# Patient Record
Sex: FEMALE | Race: WHITE | Employment: UNEMPLOYED | URBAN - METROPOLITAN AREA
[De-identification: names, ages, dates, MRNs, and addresses within clinical notes are randomized per-mention and may not be internally consistent; named-entity substitution may affect disease eponyms.]

---

## 2017-06-08 PROBLEM — Z72.0 TOBACCO USE: Status: ACTIVE | Noted: 2017-06-08

## 2017-06-08 PROBLEM — R10.84 GENERALIZED ABDOMINAL PAIN: Status: ACTIVE | Noted: 2017-06-08

## 2017-06-08 PROBLEM — R51.9 FREQUENT HEADACHES: Status: ACTIVE | Noted: 2017-06-08

## 2017-06-08 PROBLEM — R79.89 ELEVATED LIVER FUNCTION TESTS: Status: ACTIVE | Noted: 2017-06-08

## 2017-07-07 PROBLEM — B17.10 ACUTE HEPATITIS C VIRUS INFECTION WITHOUT HEPATIC COMA: Status: ACTIVE | Noted: 2017-07-07

## 2017-11-06 PROBLEM — O98.412 CHRONIC HEPATITIS C AFFECTING ANTEPARTUM CARE OF MOTHER IN SECOND TRIMESTER (HCC): Status: ACTIVE | Noted: 2017-07-07

## 2017-11-06 PROBLEM — F11.20 HEROIN ADDICTION (HCC): Status: ACTIVE | Noted: 2017-11-06

## 2017-11-06 PROBLEM — Z03.75 CERVICAL SHORTENING SUSPECTED BUT NOT FOUND: Status: ACTIVE | Noted: 2017-11-06

## 2017-11-06 PROBLEM — O99.332 TOBACCO USE AFFECTING PREGNANCY IN SECOND TRIMESTER, ANTEPARTUM: Status: ACTIVE | Noted: 2017-11-06

## 2017-11-06 PROBLEM — B18.2 CHRONIC HEPATITIS C AFFECTING ANTEPARTUM CARE OF MOTHER IN SECOND TRIMESTER (HCC): Status: ACTIVE | Noted: 2017-07-07

## 2018-01-15 PROBLEM — O26.643 INTRAHEPATIC CHOLESTASIS OF PREGNANCY IN THIRD TRIMESTER, ANTEPARTUM: Status: ACTIVE | Noted: 2018-01-15

## 2018-01-15 PROBLEM — K83.1 INTRAHEPATIC CHOLESTASIS OF PREGNANCY IN THIRD TRIMESTER, ANTEPARTUM: Status: ACTIVE | Noted: 2018-01-15

## 2018-01-15 PROBLEM — L29.9 PRURITUS: Status: ACTIVE | Noted: 2018-01-15

## 2018-01-15 PROBLEM — O26.613 INTRAHEPATIC CHOLESTASIS OF PREGNANCY IN THIRD TRIMESTER, ANTEPARTUM: Status: ACTIVE | Noted: 2018-01-15

## 2018-02-13 PROBLEM — O36.5930 POOR FETAL GROWTH AFFECTING MANAGEMENT OF MOTHER IN THIRD TRIMESTER: Status: ACTIVE | Noted: 2018-02-13

## 2018-02-24 PROBLEM — Z36.89 NST (NON-STRESS TEST) REACTIVE: Status: ACTIVE | Noted: 2018-02-24

## 2018-02-28 PROBLEM — Z87.898 HISTORY OF POOR FETAL GROWTH: Status: ACTIVE | Noted: 2018-02-13

## 2018-03-05 PROBLEM — Z3A.36 36 WEEKS GESTATION OF PREGNANCY: Status: ACTIVE | Noted: 2018-03-05

## 2018-03-06 PROBLEM — Z3A.36 PREGNANCY WITH 36 COMPLETED WEEKS GESTATION: Status: ACTIVE | Noted: 2018-03-06

## 2018-03-06 PROCEDURE — 9990 CHARGE CONVERSION

## 2018-03-06 PROCEDURE — 96372 THER/PROPH/DIAG INJ SC/IM: CPT

## 2018-03-08 ENCOUNTER — HOSPITAL ENCOUNTER (INPATIENT)
Dept: MOTHER INFANT UNIT | Age: 28
LOS: 3 days | Discharge: HOME | DRG: 560 | End: 2018-03-11
Attending: OBSTETRICS & GYNECOLOGY | Admitting: OBSTETRICS & GYNECOLOGY
Payer: COMMERCIAL

## 2018-03-08 LAB
ABO/RH: NORMAL
AMPHETAMINE SCREEN, URINE: NOT DETECTED
ANTIBODY SCREEN: NORMAL
BARBITURATE SCREEN URINE: NOT DETECTED
BENZODIAZEPINE SCREEN, URINE: NOT DETECTED
CANNABINOID SCREEN URINE: NOT DETECTED
COCAINE METABOLITE SCREEN URINE: NOT DETECTED
HCT VFR BLD CALC: 39.8 % (ref 34–48)
HEMOGLOBIN: 13.4 G/DL (ref 11.5–15.5)
MCH RBC QN AUTO: 30.7 PG (ref 26–35)
MCHC RBC AUTO-ENTMCNC: 33.7 % (ref 32–34.5)
MCV RBC AUTO: 91.3 FL (ref 80–99.9)
METHADONE SCREEN, URINE: NOT DETECTED
OPIATE SCREEN URINE: NOT DETECTED
PDW BLD-RTO: 14.1 FL (ref 11.5–15)
PHENCYCLIDINE SCREEN URINE: NOT DETECTED
PLATELET # BLD: 284 E9/L (ref 130–450)
PMV BLD AUTO: 11.2 FL (ref 7–12)
PROPOXYPHENE SCREEN: NOT DETECTED
RBC # BLD: 4.36 E12/L (ref 3.5–5.5)
WBC # BLD: 9.4 E9/L (ref 4.5–11.5)

## 2018-03-08 PROCEDURE — 80307 DRUG TEST PRSMV CHEM ANLYZR: CPT

## 2018-03-08 PROCEDURE — 36415 COLL VENOUS BLD VENIPUNCTURE: CPT

## 2018-03-08 PROCEDURE — 85027 COMPLETE CBC AUTOMATED: CPT

## 2018-03-08 PROCEDURE — 86901 BLOOD TYPING SEROLOGIC RH(D): CPT

## 2018-03-08 PROCEDURE — 86850 RBC ANTIBODY SCREEN: CPT

## 2018-03-08 PROCEDURE — 9990 CHARGE CONVERSION

## 2018-03-08 PROCEDURE — 86900 BLOOD TYPING SEROLOGIC ABO: CPT

## 2018-03-08 PROCEDURE — 3E0P7VZ INTRODUCTION OF HORMONE INTO FEMALE REPRODUCTIVE, VIA NATURAL OR ARTIFICIAL OPENING: ICD-10-PCS | Performed by: OBSTETRICS & GYNECOLOGY

## 2018-03-08 RX ORDER — ACETAMINOPHEN 650 MG
TABLET, EXTENDED RELEASE ORAL
Status: DISCONTINUED
Start: 2018-03-08 | End: 2018-03-08 | Stop reason: WASHOUT

## 2018-03-08 RX ORDER — SODIUM CHLORIDE, SODIUM LACTATE, POTASSIUM CHLORIDE, CALCIUM CHLORIDE 600; 310; 30; 20 MG/100ML; MG/100ML; MG/100ML; MG/100ML
INJECTION, SOLUTION INTRAVENOUS CONTINUOUS
Status: DISCONTINUED | OUTPATIENT
Start: 2018-03-08 | End: 2018-03-09

## 2018-03-08 RX ORDER — LIDOCAINE HYDROCHLORIDE 10 MG/ML
INJECTION, SOLUTION INFILTRATION; PERINEURAL
Status: DISCONTINUED
Start: 2018-03-08 | End: 2018-03-08 | Stop reason: WASHOUT

## 2018-03-08 RX ADMIN — SODIUM CHLORIDE, SODIUM LACTATE, POTASSIUM CHLORIDE, CALCIUM CHLORIDE: 600; 310; 30; 20 INJECTION, SOLUTION INTRAVENOUS at 09:22

## 2018-03-08 RX ADMIN — Medication 1 MILLI-UNITS/MIN: at 23:33

## 2018-03-08 NOTE — ANESTHESIA PRE-OP
Department of Anesthesiology  Preprocedure Note       Name:  Jonna Keller   Age:  33 y.o.  :  1990                                          MRN:  [id number]         Date:  3/8/2018        Department of Anesthesiology  Pre-Anesthesia Evaluation/Consultation    NAME:  Jonna Keller                                         AGE: 33 y.o. MRN:    [id number]     Procedure (Scheduled):  Labor Options (spinal vs epidural vs GA)  Surgeon:  Dr. Steve Tejeda MD     No Known Allergies  Patient Active Problem List   Diagnosis Code    Elevated liver function tests R79.89    Chronic hepatitis C affecting antepartum care of mother in second trimester ([facility] [address] O98.412, B18.2    Heroin addiction ([facility] [address] F11.20    Tobacco use affecting pregnancy in second trimester, antepartum O80.1    Cervical shortening suspected but not found Z03.75    Pruritus L29.9    Intrahepatic cholestasis of pregnancy in third trimester, antepartum O26.613, K83.1    History of poor fetal growth Z87.898    NST (non-stress test) reactive Z36.89    36 weeks gestation of pregnancy Z3A.36    Pregnancy with 39 completed weeks gestation Z3A.36    Vaginal delivery O80     Past Medical History:   Diagnosis Date    Abnormal Pap smear of cervix     biopsy?  Hepatitis C     HPV in female      Past Surgical History:   Procedure Laterality Date    DILATION AND CURETTAGE      MAB    DILATION AND CURETTAGE OF UTERUS       Social History   Substance Use Topics    Smoking status: Current Every Day Smoker     Packs/day: 0.50     Types: Cigarettes    Smokeless tobacco: Never Used    Alcohol use No     Medications  No current facility-administered medications on file prior to encounter. Current Outpatient Prescriptions on File Prior to Encounter   Medication Sig Dispense Refill    hydrocortisone (ALA-MADDIE) 1 % cream Apply topically 2 times daily.  1 Tube 1    tucks (TUCKS) 50 % PADS Use PRN for hemorrhoids 40 each 1    ursodiol (ACTIGALL) 01/15/2018    GLUCOSE 74 2011    PROT 6.1 01/15/2018    CALCIUM 8.3 01/15/2018    BILITOT 0.5 01/15/2018    ALKPHOS 155 01/15/2018    AST 40 01/15/2018    ALT 49 01/15/2018     BMP    Lab Results   Component Value Date     01/15/2018    K 4.4 01/15/2018     01/15/2018    CO2 24 01/15/2018    BUN 8 01/15/2018    CREATININE 0.4 01/15/2018    CALCIUM 8.3 01/15/2018    GFRAA >60 01/15/2018    LABGLOM >60 01/15/2018    GLUCOSE 78 01/15/2018    GLUCOSE 74 2011     POCGlucose  No results for input(s): GLUCOSE in the last 72 hours. Coags    Lab Results   Component Value Date    PROTIME 10.1 01/15/2018    INR 0.9 01/15/2018    APTT 30.6      HCG (If Applicable)   Lab Results   Component Value Date    PREGTESTUR negative 2017    PREGSERUM NEGATIVE 2011      ABGs No results found for: PHART, PO2ART, CPR9FDU, NKL7WWK, BEART, C5ODBBDT   Type & Screen (If Applicable)  Lab Results   Component Value Date    ABORH B POS 2018     Obstetric History       T3      L3     SAB1   TAB0   Ectopic0   Molar0   Multiple0   Live Births3       # Outcome Date GA Lbr Walter/2nd Weight Sex Delivery Anes PTL Lv   6 Current            5 Term 07/15/14 39w0d  6 lb 5 oz (2.863 kg) F Vag-Spont None N NAOMI   4 Term 12/07/10 38w0d  6 lb 1 oz (2.75 kg) M Vag-Spont None N NAOMI   3 Term 09 37w0d  5 lb 8 oz (2.495 kg) M Vag-Spont None N NAOMI   2 SAB            1 AB                 36w6d        Surgeon:    Procedure:    Medications prior to admission:   Prior to Admission medications    Medication Sig Start Date End Date Taking? Authorizing Provider   hydrocortisone (ALA-MADDIE) 1 % cream Apply topically 2 times daily.  3/5/18 3/12/18 Yes Quinten Lang CNM   tucks (TUCKS) 50 % PADS Use PRN for hemorrhoids 3/5/18  Yes Quinten Lang CNM   ursodiol (ACTIGALL) 300 MG capsule Take 1 capsule by mouth 3 times daily (with meals) 1/15/18 1/15/19 Yes Christian Bolton MD   ranitidine (ZANTAC) 150 MG Resp: 16   Temp: 97.7 °F (36.5 °C)   TempSrc: Oral   Weight: 124 lb (56.2 kg)   Height: 5' 3\" (1.6 m)                                              BP Readings from Last 3 Encounters:   03/08/18 117/63   03/05/18 119/72   02/28/18 117/72       NPO Status: Time of last liquid consumption: 0700                        Time of last solid consumption: 0700                        Date of last liquid consumption: 03/08/18                        Date of last solid food consumption: 03/08/18    BMI:   Wt Readings from Last 3 Encounters:   03/08/18 124 lb (56.2 kg)   03/05/18 124 lb (56.2 kg)   02/28/18 125 lb (56.7 kg)     Body mass index is 21.97 kg/m². Anesthesia Evaluation  Patient summary reviewed and Nursing notes reviewed   History of anesthetic complications: denies. Airway: Mallampati: II  TM distance: >3 FB   Neck ROM: full  Mouth opening: > = 3 FB Dental:          Pulmonary:Negative Pulmonary ROS breath sounds clear to auscultation            Patient smoked on day of surgery. ROS comment: Heroin addiction [id number]   Smoker 1 pack every 2 days x 14 years   Cardiovascular:Negative CV ROS            Rhythm: regular  Rate: normal                    Neuro/Psych:   Negative Neuro/Psych ROS              GI/Hepatic/Renal:   (+) hepatitis (diagnosed 2018): C, liver disease:,          ROS comment: D/c x2 2014/2015  Vaginal delivery 2009/ 20010/2014/ never had epidurals. Endo/Other: Negative Endo/Other ROS                    Abdominal:           Vascular: negative vascular ROS. Anesthesia Plan      general, spinal and epidural     ASA 2     (Pt here for cervidil induction. Risks and benefits discussed agree to proceed with epidural)        Anesthetic plan and risks discussed with patient and spouse. Use of blood products discussed with patient whom consented to blood products. Plan discussed with attending.                 Pedro Pablo Norton CRNA

## 2018-03-08 NOTE — PAYOR INFORMATION
Patient Aureliano Casanova:  [de-identified]  Primary AUTH/CERT:    [address] Name:   [address]  Primary Insurance Plan Name:  [employer] [place] [employer] [facility]  Primary Insurance Group Number:    Primary Insurance Plan Type: X  Primary Insurance Policy Number:  [id number]

## 2018-03-09 PROCEDURE — 51701 INSERT BLADDER CATHETER: CPT

## 2018-03-09 PROCEDURE — 10907ZC DRAINAGE OF AMNIOTIC FLUID, THERAPEUTIC FROM PRODUCTS OF CONCEPTION, VIA NATURAL OR ARTIFICIAL OPENING: ICD-10-PCS | Performed by: OBSTETRICS & GYNECOLOGY

## 2018-03-09 PROCEDURE — 88307 TISSUE EXAM BY PATHOLOGIST: CPT

## 2018-03-09 PROCEDURE — 3E033VJ INTRODUCTION OF OTHER HORMONE INTO PERIPHERAL VEIN, PERCUTANEOUS APPROACH: ICD-10-PCS | Performed by: OBSTETRICS & GYNECOLOGY

## 2018-03-09 PROCEDURE — 9990 CHARGE CONVERSION

## 2018-03-09 RX ORDER — SODIUM CHLORIDE, SODIUM LACTATE, POTASSIUM CHLORIDE, AND CALCIUM CHLORIDE .6; .31; .03; .02 G/100ML; G/100ML; G/100ML; G/100ML
1000 INJECTION, SOLUTION INTRAVENOUS ONCE
Status: COMPLETED | OUTPATIENT
Start: 2018-03-09 | End: 2018-03-09

## 2018-03-09 RX ORDER — LANOLIN 100 %
OINTMENT (GRAM) TOPICAL PRN
Status: DISCONTINUED | OUTPATIENT
Start: 2018-03-09 | End: 2018-03-12 | Stop reason: HOSPADM

## 2018-03-09 RX ORDER — SODIUM CHLORIDE 0.9 % (FLUSH) 0.9 %
10 SYRINGE (ML) INJECTION EVERY 12 HOURS SCHEDULED
Status: DISCONTINUED | OUTPATIENT
Start: 2018-03-09 | End: 2018-03-12 | Stop reason: HOSPADM

## 2018-03-09 RX ORDER — EPHEDRINE SULFATE 50 MG/ML
5 INJECTION INTRAVENOUS PRN
Status: DISCONTINUED | OUTPATIENT
Start: 2018-03-09 | End: 2018-03-09

## 2018-03-09 RX ORDER — ACETAMINOPHEN 650 MG
TABLET, EXTENDED RELEASE ORAL
Status: DISCONTINUED
Start: 2018-03-09 | End: 2018-03-09

## 2018-03-09 RX ORDER — FERROUS SULFATE 325(65) MG
325 TABLET ORAL 2 TIMES DAILY WITH MEALS
Status: DISCONTINUED | OUTPATIENT
Start: 2018-03-09 | End: 2018-03-12 | Stop reason: HOSPADM

## 2018-03-09 RX ORDER — ACETAMINOPHEN 325 MG/1
650 TABLET ORAL EVERY 4 HOURS PRN
Status: DISCONTINUED | OUTPATIENT
Start: 2018-03-09 | End: 2018-03-09

## 2018-03-09 RX ORDER — DOCUSATE SODIUM 100 MG/1
100 CAPSULE, LIQUID FILLED ORAL 2 TIMES DAILY
Status: DISCONTINUED | OUTPATIENT
Start: 2018-03-09 | End: 2018-03-12 | Stop reason: HOSPADM

## 2018-03-09 RX ORDER — LIDOCAINE HYDROCHLORIDE 10 MG/ML
INJECTION, SOLUTION INFILTRATION; PERINEURAL
Status: DISCONTINUED
Start: 2018-03-09 | End: 2018-03-09

## 2018-03-09 RX ORDER — IBUPROFEN 800 MG/1
800 TABLET ORAL EVERY 8 HOURS PRN
Status: DISCONTINUED | OUTPATIENT
Start: 2018-03-09 | End: 2018-03-12 | Stop reason: HOSPADM

## 2018-03-09 RX ORDER — SODIUM CHLORIDE, SODIUM LACTATE, POTASSIUM CHLORIDE, CALCIUM CHLORIDE 600; 310; 30; 20 MG/100ML; MG/100ML; MG/100ML; MG/100ML
INJECTION, SOLUTION INTRAVENOUS CONTINUOUS
Status: DISCONTINUED | OUTPATIENT
Start: 2018-03-09 | End: 2018-03-12 | Stop reason: HOSPADM

## 2018-03-09 RX ORDER — ACETAMINOPHEN 325 MG/1
650 TABLET ORAL EVERY 4 HOURS PRN
Status: DISCONTINUED | OUTPATIENT
Start: 2018-03-09 | End: 2018-03-12 | Stop reason: HOSPADM

## 2018-03-09 RX ORDER — SODIUM CHLORIDE 0.9 % (FLUSH) 0.9 %
10 SYRINGE (ML) INJECTION PRN
Status: DISCONTINUED | OUTPATIENT
Start: 2018-03-09 | End: 2018-03-12 | Stop reason: HOSPADM

## 2018-03-09 RX ADMIN — ACETAMINOPHEN 650 MG: 325 TABLET ORAL at 10:00

## 2018-03-09 RX ADMIN — SODIUM CHLORIDE, SODIUM LACTATE, POTASSIUM CHLORIDE, AND CALCIUM CHLORIDE 1000 ML: .6; .31; .03; .02 INJECTION, SOLUTION INTRAVENOUS at 11:00

## 2018-03-09 RX ADMIN — IBUPROFEN 800 MG: 800 TABLET, FILM COATED ORAL at 20:52

## 2018-03-09 RX ADMIN — SODIUM CHLORIDE, SODIUM LACTATE, POTASSIUM CHLORIDE, AND CALCIUM CHLORIDE 1000 ML: .6; .31; .03; .02 INJECTION, SOLUTION INTRAVENOUS at 12:00

## 2018-03-09 RX ADMIN — DOCUSATE SODIUM 100 MG: 100 CAPSULE, LIQUID FILLED ORAL at 20:50

## 2018-03-09 ASSESSMENT — PAIN SCALES - GENERAL
PAINLEVEL_OUTOF10: 0
PAINLEVEL_OUTOF10: 3
PAINLEVEL_OUTOF10: 3

## 2018-03-09 NOTE — PROGRESS NOTES
Dr London Cornea aware pitocin infusing at 20 marcel-units/mn since 0600. Pt sagrario every 2-4 minutes. Pt slept throughout the night. Rates pain 5/10 on pain scale describes pain as a stronger menstrual cramp. No new orders at this time.

## 2018-03-09 NOTE — PROCEDURES
Department of Anesthesiology  Continuous Labor Epidural Procedure Note    Name:  Liz Diggs                                         Age: 33 y.o. MRN:  [id number]    Attending Obstetrician:  Kenyon Ro MD  Procedure: Labor Epidural [07593]     Diagnosis: Vaginal Delivery [650]    Procedure Start Time:  4923  Procedure End Time:  7303    Allergies:  Patient has no known allergies. H&P Status: H&P was reviewed, the patient was examined and no change has occurred in patient's condition since H&P was completed. Diagnostic Data Review:  PT/INR  No results found for: PTINR  PT/INR Warfarin:  No components found for: Emelia Wooldridge  PTT:   Lab Results   Component Value Date    APTT 30.6 01/15/2018     PTT Heparin: No components found for: APTTHEP  CBC:   Lab Results   Component Value Date    WBC 9.4 03/08/2018    RBC 4.36 03/08/2018    HGB 13.4 03/08/2018    HCT 39.8 03/08/2018    MCV 91.3 03/08/2018    RDW 14.1 03/08/2018     03/08/2018       Consent:  Risks and benefits of the labor epidural were discussed and the patient was given the opportunity to ask questions. Informed consent was obtained. Procedure:  Position: Sitting. Sterile technique: Hat,mask,gloves. Skin Prep/Drape: Betadine. Skin Local: 3ml 1% lidocaine  Interspace: L3-L4   Catheter Distances:  Skin to epidural space 4cm. Catheter 10cm at skin. Aspiration for CSF/Blood: Negative    Paresthesia: Negative    Test dose: Negative (3ml 1. 5%Lidocaine with 1:200,000 Epinephrine)    Difficulties/Complications: None    Epidural Medication:  Bolus Dose:   Premixed Syringe: Bupivacaine 0.1% with Fentanyl 1.85 mcg/ml (PF) 10ml given. Injection was made incrementally with constant monitoring and aspiration. Infusion Dose:  Premixed Bag: Bupivacaine 0.1% with Fentanyl 1.85 mcg/ml started at 15ml/hr.     Levon Krueger CRNA (ATTENDING ANESTHESIOLOGIST: Dr. Ardath Soulier)    12:17 PM

## 2018-03-10 ENCOUNTER — ANESTHESIA (OUTPATIENT)
Dept: MOTHER INFANT UNIT | Age: 28
End: 2018-03-10

## 2018-03-10 ENCOUNTER — ANESTHESIA EVENT (OUTPATIENT)
Dept: MOTHER INFANT UNIT | Age: 28
End: 2018-03-10

## 2018-03-10 PROCEDURE — 1220000000 HC SEMI PRIVATE OB R&B

## 2018-03-10 PROCEDURE — 6370000000 HC RX 637 (ALT 250 FOR IP): Performed by: OBSTETRICS & GYNECOLOGY

## 2018-03-10 RX ADMIN — Medication: at 09:25

## 2018-03-10 RX ADMIN — IBUPROFEN 800 MG: 800 TABLET, FILM COATED ORAL at 09:25

## 2018-03-10 RX ADMIN — IBUPROFEN 800 MG: 800 TABLET, FILM COATED ORAL at 17:42

## 2018-03-10 RX ADMIN — ACETAMINOPHEN 650 MG: 325 TABLET ORAL at 13:13

## 2018-03-10 RX ADMIN — DOCUSATE SODIUM 100 MG: 100 CAPSULE, LIQUID FILLED ORAL at 09:25

## 2018-03-10 RX ADMIN — ACETAMINOPHEN 650 MG: 325 TABLET ORAL at 20:40

## 2018-03-10 RX ADMIN — DOCUSATE SODIUM 100 MG: 100 CAPSULE, LIQUID FILLED ORAL at 20:40

## 2018-03-10 ASSESSMENT — PAIN DESCRIPTION - DESCRIPTORS
DESCRIPTORS: ACHING
DESCRIPTORS: CRAMPING;DISCOMFORT
DESCRIPTORS: DISCOMFORT

## 2018-03-10 ASSESSMENT — PAIN SCALES - GENERAL
PAINLEVEL_OUTOF10: 3
PAINLEVEL_OUTOF10: 0
PAINLEVEL_OUTOF10: 3
PAINLEVEL_OUTOF10: 0
PAINLEVEL_OUTOF10: 3
PAINLEVEL_OUTOF10: 0
PAINLEVEL_OUTOF10: 3

## 2018-03-10 ASSESSMENT — PAIN DESCRIPTION - FREQUENCY
FREQUENCY: INTERMITTENT

## 2018-03-10 ASSESSMENT — PAIN DESCRIPTION - PROGRESSION
CLINICAL_PROGRESSION: GRADUALLY WORSENING
CLINICAL_PROGRESSION: RESOLVED
CLINICAL_PROGRESSION: GRADUALLY WORSENING
CLINICAL_PROGRESSION: RESOLVED
CLINICAL_PROGRESSION: GRADUALLY WORSENING

## 2018-03-10 ASSESSMENT — PAIN DESCRIPTION - PAIN TYPE
TYPE: ACUTE PAIN

## 2018-03-10 ASSESSMENT — PAIN DESCRIPTION - ONSET: ONSET: GRADUAL

## 2018-03-10 ASSESSMENT — PAIN DESCRIPTION - LOCATION
LOCATION: BACK
LOCATION: ABDOMEN;BACK
LOCATION: BACK

## 2018-03-10 ASSESSMENT — PAIN DESCRIPTION - ORIENTATION
ORIENTATION: MID
ORIENTATION: MID
ORIENTATION: LOWER;MID

## 2018-03-10 NOTE — ANESTHESIA POSTPROCEDURE EVALUATION
21.97 kg/m²   Patient Vitals in the past 4 hrs:  03/10/18 1112, BP:(!) 109/57, Temp:36.5 °C (97.7 °F), Temp src:Oral, Pulse:77, Resp:16    Vital Signs Statistics: (for past 24 hrs)     Temp  Av.6 °C (97.8 °F)  Min: 36.4 °C (97.6 °F)   Min taken time: 03/10/18 0745  Max: 36.7 °C (98 °F)   Max taken time: 18 1730  Pulse  Av.6  Min: 61   Min taken time: 18  Max: 80   Max taken time: 18 1401  Resp  Av  Min: 15   Min taken time: 18  Max: 18   Max taken time: 18 2112  BP  Min: 80/61   Min taken time: 18  Max: 132/63   Max taken time: 18 1503    Level of Consciousness:  Awake    Respiratory:  Stable    Oxygen Saturation:  Stable    Cardiovascular:  Stable    Hydration:  Adequate    PONV:  Stable    Post-op Pain:  Adequate analgesia    Post-op Assessment:  No apparent anesthetic complications    Additional Follow-Up / Treatment / Comment:  None    Caprice Gómez CRNA  March 10, 2018   1:11 PM

## 2018-03-11 VITALS
SYSTOLIC BLOOD PRESSURE: 120 MMHG | DIASTOLIC BLOOD PRESSURE: 75 MMHG | RESPIRATION RATE: 16 BRPM | HEIGHT: 63 IN | HEART RATE: 91 BPM | TEMPERATURE: 98.1 F | WEIGHT: 124 LBS | BODY MASS INDEX: 21.97 KG/M2

## 2018-03-11 PROCEDURE — 90471 IMMUNIZATION ADMIN: CPT | Performed by: OBSTETRICS & GYNECOLOGY

## 2018-03-11 PROCEDURE — 6370000000 HC RX 637 (ALT 250 FOR IP): Performed by: OBSTETRICS & GYNECOLOGY

## 2018-03-11 PROCEDURE — 6360000002 HC RX W HCPCS: Performed by: OBSTETRICS & GYNECOLOGY

## 2018-03-11 PROCEDURE — 90707 MMR VACCINE SC: CPT | Performed by: OBSTETRICS & GYNECOLOGY

## 2018-03-11 RX ORDER — DIPHENHYDRAMINE HCL 25 MG
25 TABLET ORAL ONCE
Status: COMPLETED | OUTPATIENT
Start: 2018-03-11 | End: 2018-03-11

## 2018-03-11 RX ADMIN — MEASLES, MUMPS, AND RUBELLA VIRUS VACCINE LIVE 0.5 ML: 1000; 12500; 1000 INJECTION, POWDER, LYOPHILIZED, FOR SUSPENSION SUBCUTANEOUS at 19:38

## 2018-03-11 RX ADMIN — Medication: at 09:11

## 2018-03-11 RX ADMIN — IBUPROFEN 800 MG: 800 TABLET, FILM COATED ORAL at 19:43

## 2018-03-11 RX ADMIN — DIPHENHYDRAMINE HCL 25 MG: 25 TABLET ORAL at 22:02

## 2018-03-11 RX ADMIN — DOCUSATE SODIUM 100 MG: 100 CAPSULE, LIQUID FILLED ORAL at 09:11

## 2018-03-11 RX ADMIN — IBUPROFEN 800 MG: 800 TABLET, FILM COATED ORAL at 09:11

## 2018-03-11 RX ADMIN — DOCUSATE SODIUM 100 MG: 100 CAPSULE, LIQUID FILLED ORAL at 21:41

## 2018-03-11 ASSESSMENT — PAIN DESCRIPTION - PROGRESSION
CLINICAL_PROGRESSION: RESOLVED
CLINICAL_PROGRESSION: GRADUALLY WORSENING

## 2018-03-11 ASSESSMENT — PAIN DESCRIPTION - RADICULAR PAIN: RADICULAR_PAIN: ABSENT

## 2018-03-11 ASSESSMENT — PAIN DESCRIPTION - LOCATION: LOCATION: BACK

## 2018-03-11 ASSESSMENT — PAIN DESCRIPTION - FREQUENCY: FREQUENCY: INTERMITTENT

## 2018-03-11 ASSESSMENT — PAIN DESCRIPTION - ORIENTATION: ORIENTATION: MID

## 2018-03-11 ASSESSMENT — PAIN SCALES - GENERAL
PAINLEVEL_OUTOF10: 3
PAINLEVEL_OUTOF10: 0
PAINLEVEL_OUTOF10: 3

## 2018-03-11 ASSESSMENT — PAIN DESCRIPTION - PAIN TYPE: TYPE: ACUTE PAIN

## 2018-03-11 ASSESSMENT — PAIN DESCRIPTION - DESCRIPTORS: DESCRIPTORS: DISCOMFORT

## 2018-03-12 NOTE — PROGRESS NOTES
Discharge teaching done. All questions answered. Pt verbalizes understanding. When giving instructions pt was scratching legs and arms and complaining of itching. Dr. Moyer Police paged.

## 2020-09-22 NOTE — PROGRESS NOTES
MD notified of timing of hepain. Declined ACT at this time. SUBJECTIVE:   Patient without complaint    OBJECTIVE:   /69   Pulse 78   Temp 97.6 °F (36.4 °C) (Oral)   Resp 16   Ht 5' 3\" (1.6 m)   Wt 124 lb (56.2 kg)   LMP 06/30/2017 (Exact Date)   Breastfeeding?  Unknown   BMI 21.97 kg/m²      Lab Results   Component Value Date    WBC 9.4 03/08/2018    HGB 13.4 03/08/2018    HCT 39.8 03/08/2018    MCV 91.3 03/08/2018     03/08/2018        Recent Labs      03/08/18   0910   HGB  13.4   HCT  39.8      Lochia normal rubra   Uterus firm, nontender    ASSESSMENT/PLAN:   Postpartum Day #1   Advance care    Hep C+   Cholestasis of pregnancy - stable   Home tomorrow   Acute blood loss anemia - FeSO4    Jeff Lyn 3/10/2018 8:06 AM

## 2025-08-12 ENCOUNTER — APPOINTMENT (OUTPATIENT)
Dept: CT IMAGING | Age: 35
End: 2025-08-12
Payer: COMMERCIAL

## 2025-08-12 ENCOUNTER — HOSPITAL ENCOUNTER (EMERGENCY)
Age: 35
Discharge: HOME | End: 2025-08-12
Payer: COMMERCIAL

## 2025-08-12 ENCOUNTER — APPOINTMENT (OUTPATIENT)
Dept: GENERAL RADIOLOGY | Age: 35
End: 2025-08-12
Payer: COMMERCIAL

## 2025-08-12 VITALS
HEIGHT: 62 IN | BODY MASS INDEX: 25.76 KG/M2 | OXYGEN SATURATION: 99 % | DIASTOLIC BLOOD PRESSURE: 88 MMHG | WEIGHT: 140 LBS | HEART RATE: 88 BPM | RESPIRATION RATE: 18 BRPM | TEMPERATURE: 98.7 F | SYSTOLIC BLOOD PRESSURE: 136 MMHG

## 2025-08-12 DIAGNOSIS — S82.141A CLOSED FRACTURE OF RIGHT TIBIAL PLATEAU, INITIAL ENCOUNTER: Primary | ICD-10-CM

## 2025-08-12 PROCEDURE — 99284 EMERGENCY DEPT VISIT MOD MDM: CPT

## 2025-08-12 PROCEDURE — 73700 CT LOWER EXTREMITY W/O DYE: CPT

## 2025-08-12 PROCEDURE — 73562 X-RAY EXAM OF KNEE 3: CPT

## 2025-08-12 PROCEDURE — 6370000000 HC RX 637 (ALT 250 FOR IP)

## 2025-08-12 RX ORDER — IBUPROFEN 800 MG/1
800 TABLET, FILM COATED ORAL 2 TIMES DAILY PRN
Qty: 10 TABLET | Refills: 0 | Status: SHIPPED | OUTPATIENT
Start: 2025-08-12 | End: 2025-08-17

## 2025-08-12 RX ORDER — IBUPROFEN 800 MG/1
800 TABLET, FILM COATED ORAL ONCE
Status: COMPLETED | OUTPATIENT
Start: 2025-08-12 | End: 2025-08-12

## 2025-08-12 RX ORDER — ACETAMINOPHEN 500 MG
1000 TABLET ORAL EVERY 6 HOURS PRN
Qty: 40 TABLET | Refills: 0 | Status: SHIPPED | OUTPATIENT
Start: 2025-08-12 | End: 2025-08-17

## 2025-08-12 RX ADMIN — IBUPROFEN 800 MG: 800 TABLET, FILM COATED ORAL at 17:55

## 2025-08-12 ASSESSMENT — ENCOUNTER SYMPTOMS
RESPIRATORY NEGATIVE: 1
GASTROINTESTINAL NEGATIVE: 1
EYES NEGATIVE: 1
ALLERGIC/IMMUNOLOGIC NEGATIVE: 1

## 2025-08-12 ASSESSMENT — PAIN SCALES - GENERAL
PAINLEVEL_OUTOF10: 4
PAINLEVEL_OUTOF10: 8

## 2025-08-12 ASSESSMENT — LIFESTYLE VARIABLES
HOW OFTEN DO YOU HAVE A DRINK CONTAINING ALCOHOL: NEVER
HOW MANY STANDARD DRINKS CONTAINING ALCOHOL DO YOU HAVE ON A TYPICAL DAY: PATIENT DOES NOT DRINK

## 2025-08-12 ASSESSMENT — PAIN - FUNCTIONAL ASSESSMENT: PAIN_FUNCTIONAL_ASSESSMENT: 0-10

## 2025-08-12 ASSESSMENT — PAIN DESCRIPTION - ORIENTATION: ORIENTATION: RIGHT;MID

## 2025-08-12 ASSESSMENT — PAIN DESCRIPTION - DESCRIPTORS: DESCRIPTORS: ACHING

## 2025-08-12 ASSESSMENT — PAIN DESCRIPTION - LOCATION: LOCATION: LEG
